# Patient Record
Sex: FEMALE | Race: WHITE | NOT HISPANIC OR LATINO
[De-identification: names, ages, dates, MRNs, and addresses within clinical notes are randomized per-mention and may not be internally consistent; named-entity substitution may affect disease eponyms.]

---

## 2017-06-26 ENCOUNTER — RESULT REVIEW (OUTPATIENT)
Age: 32
End: 2017-06-26

## 2019-02-26 ENCOUNTER — TRANSCRIPTION ENCOUNTER (OUTPATIENT)
Age: 34
End: 2019-02-26

## 2019-02-26 ENCOUNTER — OUTPATIENT (OUTPATIENT)
Dept: OUTPATIENT SERVICES | Facility: HOSPITAL | Age: 34
LOS: 1 days | End: 2019-02-26

## 2019-02-26 VITALS
HEIGHT: 64 IN | HEART RATE: 66 BPM | SYSTOLIC BLOOD PRESSURE: 100 MMHG | DIASTOLIC BLOOD PRESSURE: 60 MMHG | WEIGHT: 128.09 LBS | OXYGEN SATURATION: 98 % | TEMPERATURE: 98 F | RESPIRATION RATE: 16 BRPM

## 2019-02-26 DIAGNOSIS — O03.9 COMPLETE OR UNSPECIFIED SPONTANEOUS ABORTION WITHOUT COMPLICATION: ICD-10-CM

## 2019-02-26 DIAGNOSIS — O02.1 MISSED ABORTION: ICD-10-CM

## 2019-02-26 LAB
BLD GP AB SCN SERPL QL: NEGATIVE — SIGNIFICANT CHANGE UP
HCT VFR BLD CALC: 33.6 % — LOW (ref 34.5–45)
HGB BLD-MCNC: 11 G/DL — LOW (ref 11.5–15.5)
MCHC RBC-ENTMCNC: 30.2 PG — SIGNIFICANT CHANGE UP (ref 27–34)
MCHC RBC-ENTMCNC: 32.7 % — SIGNIFICANT CHANGE UP (ref 32–36)
MCV RBC AUTO: 92.3 FL — SIGNIFICANT CHANGE UP (ref 80–100)
NRBC # FLD: 0 K/UL — LOW (ref 25–125)
PLATELET # BLD AUTO: 224 K/UL — SIGNIFICANT CHANGE UP (ref 150–400)
PMV BLD: 10.6 FL — SIGNIFICANT CHANGE UP (ref 7–13)
RBC # BLD: 3.64 M/UL — LOW (ref 3.8–5.2)
RBC # FLD: 13.4 % — SIGNIFICANT CHANGE UP (ref 10.3–14.5)
RH IG SCN BLD-IMP: POSITIVE — SIGNIFICANT CHANGE UP
WBC # BLD: 7.94 K/UL — SIGNIFICANT CHANGE UP (ref 3.8–10.5)
WBC # FLD AUTO: 7.94 K/UL — SIGNIFICANT CHANGE UP (ref 3.8–10.5)

## 2019-02-26 NOTE — H&P PST ADULT - HISTORY OF PRESENT ILLNESS
This is a 34 y/o female whose native language is Indonesian; comprehends and verbalizes some English. Refused hospital  and requested that , Saima,  function as .  Patient presents at approximately 12 weeks gestation with recent sonogram revealing "no heart beat." Scheduled for DVC on 2-27-19

## 2019-02-26 NOTE — H&P PST ADULT - SOURCE OF INFORMATION, PROFILE
, Audie functioned as ; Audie's cell [phone]; home [phone]; Audie's work [phone]/patient

## 2019-02-26 NOTE — H&P PST ADULT - PMH
Language barrier  Native language Cook Islander; comprehends and verbalizes some English  Miscarriage  pregnancy at present in Feb. 2019

## 2019-02-26 NOTE — H&P PST ADULT - LYMPHATIC
supraclavicular R/posterior cervical R/supraclavicular L/posterior cervical L/anterior cervical R/anterior cervical L

## 2019-02-26 NOTE — ASU PATIENT PROFILE, ADULT - PMH
Language barrier  Native language Singaporean; comprehends and verbalizes some English  Miscarriage  pregnancy at present in Feb. 2019

## 2019-02-27 ENCOUNTER — RESULT REVIEW (OUTPATIENT)
Age: 34
End: 2019-02-27

## 2019-02-27 ENCOUNTER — OUTPATIENT (OUTPATIENT)
Dept: OUTPATIENT SERVICES | Facility: HOSPITAL | Age: 34
LOS: 1 days | Discharge: HOME | End: 2019-02-27
Payer: COMMERCIAL

## 2019-02-27 ENCOUNTER — OUTPATIENT (OUTPATIENT)
Dept: OUTPATIENT SERVICES | Facility: HOSPITAL | Age: 34
LOS: 1 days | End: 2019-02-27
Payer: SELF-PAY

## 2019-02-27 VITALS
HEART RATE: 77 BPM | RESPIRATION RATE: 16 BRPM | WEIGHT: 128.09 LBS | HEIGHT: 64 IN | OXYGEN SATURATION: 100 % | TEMPERATURE: 99 F | DIASTOLIC BLOOD PRESSURE: 50 MMHG | SYSTOLIC BLOOD PRESSURE: 107 MMHG

## 2019-02-27 VITALS
HEART RATE: 66 BPM | TEMPERATURE: 99 F | SYSTOLIC BLOOD PRESSURE: 112 MMHG | DIASTOLIC BLOOD PRESSURE: 58 MMHG | OXYGEN SATURATION: 100 % | RESPIRATION RATE: 18 BRPM

## 2019-02-27 DIAGNOSIS — O02.1 MISSED ABORTION: ICD-10-CM

## 2019-02-27 PROCEDURE — 88305 TISSUE EXAM BY PATHOLOGIST: CPT | Mod: 26

## 2019-02-27 PROCEDURE — 88280 CHROMOSOME KARYOTYPE STUDY: CPT

## 2019-02-27 PROCEDURE — 88291 CYTO/MOLECULAR REPORT: CPT

## 2019-02-27 PROCEDURE — 88264 CHROMOSOME ANALYSIS 20-25: CPT

## 2019-02-27 PROCEDURE — 88233 TISSUE CULTURE SKIN/BIOPSY: CPT

## 2019-02-27 RX ORDER — SODIUM CHLORIDE 9 MG/ML
1000 INJECTION, SOLUTION INTRAVENOUS
Qty: 0 | Refills: 0 | Status: DISCONTINUED | OUTPATIENT
Start: 2019-02-27 | End: 2019-03-01

## 2019-02-27 RX ORDER — SODIUM CHLORIDE 9 MG/ML
1000 INJECTION, SOLUTION INTRAVENOUS
Qty: 0 | Refills: 0 | Status: DISCONTINUED | OUTPATIENT
Start: 2019-02-27 | End: 2019-02-27

## 2019-02-27 RX ADMIN — SODIUM CHLORIDE 30 MILLILITER(S): 9 INJECTION, SOLUTION INTRAVENOUS at 14:24

## 2019-02-27 NOTE — ASU DISCHARGE PLAN (ADULT/PEDIATRIC). - MEDICATION SUMMARY - MEDICATIONS TO TAKE
I will START or STAY ON the medications listed below when I get home from the hospital:  None I will START or STAY ON the medications listed below when I get home from the hospital:    Methergine 0.2 mg oral tablet  -- 1 tab(s) by mouth every 4 hours   -- It is very important that you take or use this exactly as directed.  Do not skip doses or discontinue unless directed by your doctor.    -- Indication: For bleeding

## 2019-02-27 NOTE — ASU DISCHARGE PLAN (ADULT/PEDIATRIC). - NOTIFY
Bleeding that does not stop Persistent Nausea and Vomiting/Unable to Urinate/Bleeding that does not stop/Pain not relieved by Medications/GYN Fever>100.4/Inability to Tolerate Liquids or Foods

## 2019-02-27 NOTE — ASU DISCHARGE PLAN (ADULT/PEDIATRIC). - NURSING INSTRUCTIONS
take OTC tylenol or acetominophen for pain OR OTC motrin or advil  Nothing in vagina, no intercourse, no douching, no tampons, no tub baths, and no swimming for 2 weeks or until cleared by M.D. Nothing in vagina, no intercourse, no douching, no tampons, no tub baths, and no swimming for 2 weeks or until cleared by M.D. Nothing in vagina, no intercourse, no douching, no tampons, no tub baths, and no swimming for 2 weeks or until cleared by M.D. take OTC tylenol or acetominophen for pain OR OTC motrin or advil as needed for pain

## 2019-03-01 PROBLEM — Z78.9 OTHER SPECIFIED HEALTH STATUS: Chronic | Status: ACTIVE | Noted: 2019-02-26

## 2019-03-05 LAB — SURGICAL PATHOLOGY STUDY: SIGNIFICANT CHANGE UP

## 2019-03-20 LAB — CHROM ANALY OVERALL INTERP SPEC-IMP: SIGNIFICANT CHANGE UP

## 2019-06-03 ENCOUNTER — RESULT REVIEW (OUTPATIENT)
Age: 34
End: 2019-06-03

## 2019-09-23 ENCOUNTER — EMERGENCY (EMERGENCY)
Facility: HOSPITAL | Age: 34
LOS: 1 days | Discharge: HOME | End: 2019-09-23
Admitting: STUDENT IN AN ORGANIZED HEALTH CARE EDUCATION/TRAINING PROGRAM
Payer: COMMERCIAL

## 2019-09-23 VITALS
HEART RATE: 66 BPM | RESPIRATION RATE: 16 BRPM | OXYGEN SATURATION: 100 % | SYSTOLIC BLOOD PRESSURE: 96 MMHG | DIASTOLIC BLOOD PRESSURE: 50 MMHG | TEMPERATURE: 98 F

## 2019-09-23 LAB
ALBUMIN SERPL ELPH-MCNC: 4.6 G/DL — SIGNIFICANT CHANGE UP (ref 3.3–5)
ALP SERPL-CCNC: 55 U/L — SIGNIFICANT CHANGE UP (ref 40–120)
ALT FLD-CCNC: 13 U/L — SIGNIFICANT CHANGE UP (ref 4–33)
ANION GAP SERPL CALC-SCNC: 14 MMO/L — SIGNIFICANT CHANGE UP (ref 7–14)
APPEARANCE UR: CLEAR — SIGNIFICANT CHANGE UP
AST SERPL-CCNC: 15 U/L — SIGNIFICANT CHANGE UP (ref 4–32)
BACTERIA # UR AUTO: HIGH
BASOPHILS # BLD AUTO: 0.03 K/UL — SIGNIFICANT CHANGE UP (ref 0–0.2)
BASOPHILS NFR BLD AUTO: 0.3 % — SIGNIFICANT CHANGE UP (ref 0–2)
BILIRUB SERPL-MCNC: 0.3 MG/DL — SIGNIFICANT CHANGE UP (ref 0.2–1.2)
BILIRUB UR-MCNC: NEGATIVE — SIGNIFICANT CHANGE UP
BLOOD UR QL VISUAL: SIGNIFICANT CHANGE UP
BUN SERPL-MCNC: 9 MG/DL — SIGNIFICANT CHANGE UP (ref 7–23)
CALCIUM SERPL-MCNC: 9.9 MG/DL — SIGNIFICANT CHANGE UP (ref 8.4–10.5)
CHLORIDE SERPL-SCNC: 102 MMOL/L — SIGNIFICANT CHANGE UP (ref 98–107)
CO2 SERPL-SCNC: 22 MMOL/L — SIGNIFICANT CHANGE UP (ref 22–31)
COLOR SPEC: SIGNIFICANT CHANGE UP
CREAT SERPL-MCNC: 0.6 MG/DL — SIGNIFICANT CHANGE UP (ref 0.5–1.3)
EOSINOPHIL # BLD AUTO: 0.03 K/UL — SIGNIFICANT CHANGE UP (ref 0–0.5)
EOSINOPHIL NFR BLD AUTO: 0.3 % — SIGNIFICANT CHANGE UP (ref 0–6)
GLUCOSE SERPL-MCNC: 80 MG/DL — SIGNIFICANT CHANGE UP (ref 70–99)
GLUCOSE UR-MCNC: NEGATIVE — SIGNIFICANT CHANGE UP
HCG SERPL-ACNC: SIGNIFICANT CHANGE UP MIU/ML
HCT VFR BLD CALC: 37.3 % — SIGNIFICANT CHANGE UP (ref 34.5–45)
HGB BLD-MCNC: 12.5 G/DL — SIGNIFICANT CHANGE UP (ref 11.5–15.5)
HYALINE CASTS # UR AUTO: NEGATIVE — SIGNIFICANT CHANGE UP
IMM GRANULOCYTES NFR BLD AUTO: 0.3 % — SIGNIFICANT CHANGE UP (ref 0–1.5)
KETONES UR-MCNC: SIGNIFICANT CHANGE UP
LEUKOCYTE ESTERASE UR-ACNC: NEGATIVE — SIGNIFICANT CHANGE UP
LIDOCAIN IGE QN: 24.4 U/L — SIGNIFICANT CHANGE UP (ref 7–60)
LYMPHOCYTES # BLD AUTO: 19.7 % — SIGNIFICANT CHANGE UP (ref 13–44)
LYMPHOCYTES # BLD AUTO: 2.03 K/UL — SIGNIFICANT CHANGE UP (ref 1–3.3)
MCHC RBC-ENTMCNC: 30.3 PG — SIGNIFICANT CHANGE UP (ref 27–34)
MCHC RBC-ENTMCNC: 33.5 % — SIGNIFICANT CHANGE UP (ref 32–36)
MCV RBC AUTO: 90.5 FL — SIGNIFICANT CHANGE UP (ref 80–100)
MONOCYTES # BLD AUTO: 0.63 K/UL — SIGNIFICANT CHANGE UP (ref 0–0.9)
MONOCYTES NFR BLD AUTO: 6.1 % — SIGNIFICANT CHANGE UP (ref 2–14)
NEUTROPHILS # BLD AUTO: 7.58 K/UL — HIGH (ref 1.8–7.4)
NEUTROPHILS NFR BLD AUTO: 73.3 % — SIGNIFICANT CHANGE UP (ref 43–77)
NITRITE UR-MCNC: POSITIVE — HIGH
NRBC # FLD: 0 K/UL — SIGNIFICANT CHANGE UP (ref 0–0)
PH UR: 7 — SIGNIFICANT CHANGE UP (ref 5–8)
PLATELET # BLD AUTO: 231 K/UL — SIGNIFICANT CHANGE UP (ref 150–400)
PMV BLD: 10.2 FL — SIGNIFICANT CHANGE UP (ref 7–13)
POTASSIUM SERPL-MCNC: 4.2 MMOL/L — SIGNIFICANT CHANGE UP (ref 3.5–5.3)
POTASSIUM SERPL-SCNC: 4.2 MMOL/L — SIGNIFICANT CHANGE UP (ref 3.5–5.3)
PROT SERPL-MCNC: 7.7 G/DL — SIGNIFICANT CHANGE UP (ref 6–8.3)
PROT UR-MCNC: NEGATIVE — SIGNIFICANT CHANGE UP
RBC # BLD: 4.12 M/UL — SIGNIFICANT CHANGE UP (ref 3.8–5.2)
RBC # FLD: 12.6 % — SIGNIFICANT CHANGE UP (ref 10.3–14.5)
RBC CASTS # UR COMP ASSIST: SIGNIFICANT CHANGE UP (ref 0–?)
SODIUM SERPL-SCNC: 138 MMOL/L — SIGNIFICANT CHANGE UP (ref 135–145)
SP GR SPEC: 1.01 — SIGNIFICANT CHANGE UP (ref 1–1.04)
SQUAMOUS # UR AUTO: SIGNIFICANT CHANGE UP
UROBILINOGEN FLD QL: NORMAL — SIGNIFICANT CHANGE UP
WBC # BLD: 10.33 K/UL — SIGNIFICANT CHANGE UP (ref 3.8–10.5)
WBC # FLD AUTO: 10.33 K/UL — SIGNIFICANT CHANGE UP (ref 3.8–10.5)
WBC UR QL: SIGNIFICANT CHANGE UP (ref 0–?)

## 2019-09-23 PROCEDURE — 99284 EMERGENCY DEPT VISIT MOD MDM: CPT | Mod: 25

## 2019-09-23 PROCEDURE — 76830 TRANSVAGINAL US NON-OB: CPT | Mod: 26

## 2019-09-23 PROCEDURE — 93010 ELECTROCARDIOGRAM REPORT: CPT

## 2019-09-23 PROCEDURE — 76705 ECHO EXAM OF ABDOMEN: CPT | Mod: 26

## 2019-09-23 RX ORDER — CEPHALEXIN 500 MG
1 CAPSULE ORAL
Qty: 28 | Refills: 0
Start: 2019-09-23 | End: 2019-09-29

## 2019-09-23 RX ORDER — CEPHALEXIN 500 MG
1 CAPSULE ORAL
Qty: 14 | Refills: 0
Start: 2019-09-23 | End: 2019-09-29

## 2019-09-23 RX ORDER — CEPHALEXIN 500 MG
500 CAPSULE ORAL ONCE
Refills: 0 | Status: COMPLETED | OUTPATIENT
Start: 2019-09-23 | End: 2019-09-23

## 2019-09-23 RX ADMIN — Medication 500 MILLIGRAM(S): at 14:06

## 2019-09-23 NOTE — ED PROVIDER NOTE - PMH
Language barrier  Native language Cape Verdean; comprehends and verbalizes some English  Miscarriage  pregnancy at present in Feb. 2019

## 2019-09-23 NOTE — ED PROVIDER NOTE - CLINICAL SUMMARY MEDICAL DECISION MAKING FREE TEXT BOX
34 year old female 8 weeks gestation by LMP 8/3 pw epigastric pain that radiates to the back for 2 hours this morning.   Plan: labs, TVUS - determine viability of pregnancy, RUQ US to r/o cholecystitis

## 2019-09-23 NOTE — ED PROVIDER NOTE - NSFOLLOWUPINSTRUCTIONS_ED_ALL_ED_FT
Take Keflex 500mg 4 times a day for 7 days for UTI. Please continue all home medications as directed. See your regular doctor/OBGYN within 72 hours for follow-up care - bring a copy of your results with you to your appointment. Return to ER for new or worsening symptoms.

## 2019-09-23 NOTE — ED PROVIDER NOTE - PATIENT PORTAL LINK FT
You can access the FollowMyHealth Patient Portal offered by [facility] by registering at the following website: [email or web address]. By joining [facility]’s FollowMyHealth portal, you will also be able to view your health information using other applications (apps) compatible with our system.

## 2019-09-23 NOTE — ED ADULT TRIAGE NOTE - CHIEF COMPLAINT QUOTE
Pt c/o epigastric pain radiating to back with SOB since this morning. Denies N/V Pt is 8 weeks pregnant, c/o epigastric pain radiating to back with SOB since this morning. Denies N/V

## 2019-09-23 NOTE — ED PROVIDER NOTE - OBJECTIVE STATEMENT
34 year old female 8 weeks gestation by LMP 8/3 pw epigastric pain that radiates to the back for 2 hours this morning. She is now asymptomatic. Pt has appointment with her OBGYN today at 2pm. Denies n/v/f/c, Cp, SOB, dysuria, hematuria, melena, hematochezia, diarrhea, constipation, vaginal bleeding/dc/itching/odor.

## 2019-11-04 ENCOUNTER — EMERGENCY (EMERGENCY)
Facility: HOSPITAL | Age: 34
LOS: 1 days | Discharge: HOME | End: 2019-11-04
Attending: EMERGENCY MEDICINE | Admitting: EMERGENCY MEDICINE
Payer: COMMERCIAL

## 2019-11-04 VITALS
HEART RATE: 68 BPM | TEMPERATURE: 98 F | DIASTOLIC BLOOD PRESSURE: 62 MMHG | RESPIRATION RATE: 16 BRPM | SYSTOLIC BLOOD PRESSURE: 102 MMHG | OXYGEN SATURATION: 100 %

## 2019-11-04 PROCEDURE — 99283 EMERGENCY DEPT VISIT LOW MDM: CPT | Mod: 25

## 2019-11-04 PROCEDURE — 76815 OB US LIMITED FETUS(S): CPT | Mod: 26

## 2019-11-04 NOTE — ED PROVIDER NOTE - CLINICAL SUMMARY MEDICAL DECISION MAKING FREE TEXT BOX
35 y/o F, , who is 12 weeks and 5 days to date, w/ LMP on 19, here w/ L sided upper abdominal pain that started 2 hours ago. Bedside sonogram shows IUP w/ fetal heartrate of 157 BMP. Will check urine for possible UTI and dc to follow w/ OB since pt has appointment tomorrow.

## 2019-11-04 NOTE — ED PROCEDURE NOTE - PROCEDURE ADDITIONAL DETAILS
[address]138, Ultrasound, transabdominal, pregnancy, limited    Focused ED ultrasound transabdominal OB to evaluate for Intrauterine Pregnancy:    Indication: *    Findings: Single intrauterine pregnancy noted  fetal heart rate measured: 157 bpm    impression: single live intrauterine pregnancy    Procedure note and images placed in chart

## 2019-11-04 NOTE — ED ADULT TRIAGE NOTE - CHIEF COMPLAINT QUOTE
Pt comes in for c/o left side abd pain for last 2 hrs, denies any bleeding /discharge. Pt is 12 wks 4days pregnant with 3rd child, AVILA 5/15/20 and LMP 8/8/19. Pt appears in no acute distress, vs as noted

## 2019-11-04 NOTE — ED PROVIDER NOTE - NSFOLLOWUPINSTRUCTIONS_ED_ALL_ED_FT
Advance activity as tolerated.  Continue all previously prescribed medications as directed unless otherwise instructed.  Follow up with your OB in 48-72 hours- bring copies of your results.  Return to the ER for worsening or persistent symptoms, and/or ANY NEW OR CONCERNING SYMPTOMS. If you have issues obtaining follow up, please call: [phone] to obtain a doctor or specialist who takes your insurance in your area.  You may call [phone] to make an appointment with the internal medicine clinic.

## 2019-11-04 NOTE — ED PROVIDER NOTE - PATIENT PORTAL LINK FT
You can access the FollowMyHealth Patient Portal offered by [facility] by registering at the following website: [email or web address]. By joining [facility]’s FollowMyHealth portal, you will also be able to view your health information using other applications (apps) compatible with our system.

## 2019-11-04 NOTE — ED PROVIDER NOTE - OBJECTIVE STATEMENT
33 y/o F, , who is 12 weeks and 5 days to date, w/ LNMP on 19, here w/ L sided upper abdominal pain that started 2 hours ago. Pt is concerned, although it is not sever pain, because she had a miscarriage 7 months ago. Denies any fever, chills, nausea, vomiting, dysuria or vaginal bleeding. 33 y/o F, , who is 12 weeks and 5 days to date, w/ LNMP on 19, here w/ L sided upper abdominal pain that started 2 hours ago. Pt is concerned, although it is not severe pain, because she had a miscarriage 7 months ago. Denies any fever, chills, nausea, vomiting, dysuria or vaginal bleeding.

## 2019-11-04 NOTE — ED PROVIDER NOTE - PMH
Language barrier  Native language Slovenian; comprehends and verbalizes some English  Miscarriage  pregnancy at present in Feb. 2019

## 2020-02-15 ENCOUNTER — OUTPATIENT (OUTPATIENT)
Dept: INPATIENT UNIT | Facility: HOSPITAL | Age: 35
LOS: 1 days | Discharge: HOME | End: 2020-02-15
Payer: COMMERCIAL

## 2020-02-15 VITALS
SYSTOLIC BLOOD PRESSURE: 109 MMHG | DIASTOLIC BLOOD PRESSURE: 58 MMHG | HEART RATE: 75 BPM | RESPIRATION RATE: 18 BRPM | TEMPERATURE: 98 F

## 2020-02-15 VITALS — DIASTOLIC BLOOD PRESSURE: 54 MMHG | HEART RATE: 71 BPM | SYSTOLIC BLOOD PRESSURE: 105 MMHG

## 2020-02-15 DIAGNOSIS — Z98.890 OTHER SPECIFIED POSTPROCEDURAL STATES: Chronic | ICD-10-CM

## 2020-02-15 DIAGNOSIS — Z3A.00 WEEKS OF GESTATION OF PREGNANCY NOT SPECIFIED: ICD-10-CM

## 2020-02-15 DIAGNOSIS — O26.899 OTHER SPECIFIED PREGNANCY RELATED CONDITIONS, UNSPECIFIED TRIMESTER: ICD-10-CM

## 2020-02-15 LAB
APPEARANCE UR: SIGNIFICANT CHANGE UP
BACTERIA # UR AUTO: HIGH
BILIRUB UR-MCNC: NEGATIVE — SIGNIFICANT CHANGE UP
BLOOD UR QL VISUAL: NEGATIVE — SIGNIFICANT CHANGE UP
COLOR SPEC: SIGNIFICANT CHANGE UP
GLUCOSE UR-MCNC: NEGATIVE — SIGNIFICANT CHANGE UP
HYALINE CASTS # UR AUTO: NEGATIVE — SIGNIFICANT CHANGE UP
KETONES UR-MCNC: NEGATIVE — SIGNIFICANT CHANGE UP
LEUKOCYTE ESTERASE UR-ACNC: SIGNIFICANT CHANGE UP
NITRITE UR-MCNC: NEGATIVE — SIGNIFICANT CHANGE UP
PH UR: 7.5 — SIGNIFICANT CHANGE UP (ref 5–8)
PROT UR-MCNC: NEGATIVE — SIGNIFICANT CHANGE UP
RBC CASTS # UR COMP ASSIST: SIGNIFICANT CHANGE UP (ref 0–?)
SP GR SPEC: 1.01 — SIGNIFICANT CHANGE UP (ref 1–1.04)
SQUAMOUS # UR AUTO: SIGNIFICANT CHANGE UP
UROBILINOGEN FLD QL: NORMAL — SIGNIFICANT CHANGE UP
WBC UR QL: HIGH (ref 0–?)

## 2020-02-15 PROCEDURE — 99214 OFFICE O/P EST MOD 30 MIN: CPT

## 2020-02-15 PROCEDURE — 59025 FETAL NON-STRESS TEST: CPT | Mod: 26

## 2020-02-15 PROCEDURE — 76817 TRANSVAGINAL US OBSTETRIC: CPT | Mod: 26

## 2020-02-15 RX ORDER — NITROFURANTOIN MACROCRYSTAL 50 MG
1 CAPSULE ORAL
Qty: 6 | Refills: 0
Start: 2020-02-15 | End: 2020-02-17

## 2020-02-15 NOTE — OB PROVIDER TRIAGE NOTE - NSOBPROVIDERNOTE_OBGYN_ALL_OB_FT
Patient is a 35y/o ,  @ 27 2/7wks gestation who reports to triage with c/o intermittent non radiating URQ since this morning and lower abdominal pressure. The lower abdominal pressure decreases with supine or semifowlers position.  The RUQ pain is random;  and 6-7/10 when it started.  She also reports increased fetal movements this morning.   Reports urinary urgency and frequency.    Denies dysuria, lof, vaginal bleeding, fever, chills, nausea or vomiting.    Denies AP Complications  Meds - pnv  NKDA    PMH/PSH/GYN/SH - denies  OB  - - 2012 - 8lbs 9oz  - - 2016 - 8lbs 7oz  -Missed ab with D&C - 2019    Heart rrr  Lungs ctab  Abdomen gravid, soft and nontender  Negative Shoemaker's sign  Negative cva or suprapubic tenderness  TAS - footling breech/  /mvp 7.15  ruq pain more than likely due to fetal position.  SSE - Cervix appears closed.  Neg pooling/neg nitrazine  TVUS - 3.74 to 4.1cm with no dynamic changes  Orders:  UA  Next pn appointment is on 3/2/2019    Discussed patient with Dr Santiago.  Plan:  discharge home with Macrobid x 3 dys  and send Urine culture.

## 2020-02-15 NOTE — OB PROVIDER TRIAGE NOTE - PMH
Language barrier  Native language Moroccan; comprehends and verbalizes some English  Miscarriage  pregnancy at present in Feb. 2019

## 2020-02-15 NOTE — OB PROVIDER TRIAGE NOTE - HISTORY OF PRESENT ILLNESS
Patient is a 33y/o ,  @ 27 2/7wks gestation who reports to triage with c/o intermittent non radiating URQ since this morning and lower abdominal pressure. The lower abdominal pressure decreases with supine or semifowlers position.  The RUQ pain is random;  and 6-7/10 when it started.  She also reports increased fetal movements this morning.   Reports urinary urgency and frequency.    Denies dysuria, lof, vaginal bleeding, fever, chills, nausea or vomiting.    Denies AP Complications  Meds - pnv  NKDA

## 2020-02-15 NOTE — OB RN TRIAGE NOTE - PMH
Language barrier  Native language Malian; comprehends and verbalizes some English  Miscarriage  pregnancy at present in Feb. 2019

## 2020-02-15 NOTE — OB PROVIDER TRIAGE NOTE - NSHPLABSRESULTS_GEN_ALL_CORE
ua    Urinalysis Basic - ( 15 Feb 2020 17:24 )    Color: LIGHT YELLOW / Appearance: Lt TURBID / S.008 / pH: 7.5  Gluc: NEGATIVE / Ketone: NEGATIVE  / Bili: NEGATIVE / Urobili: NORMAL   Blood: NEGATIVE / Protein: NEGATIVE / Nitrite: NEGATIVE   Leuk Esterase: SMALL / RBC: 0-2 / WBC 6-10   Sq Epi: MODERATE / Non Sq Epi: x / Bacteria: MODERATE

## 2020-02-15 NOTE — OB PROVIDER TRIAGE NOTE - NSHPPHYSICALEXAM_GEN_ALL_CORE
Vital Signs Last 24 Hrs  T(C): 36.7 (15 Feb 2020 17:15), Max: 36.7 (15 Feb 2020 16:57)  HR: 71 (15 Feb 2020 18:15) (71 - 75)  BP: 105/54 (15 Feb 2020 18:15) (105/54 - 110/53)  RR: 18 (15 Feb 2020 16:57) (18 - 18)  Heart rrr  Lungs ctab  Abdomen gravid, soft and nontender  Negative Shoemaker's sign  Negative cva or suprapubic tenderness  TAS - footling breech/  /mvp 7.15  ruq pain more than likely due to fetal position.  SSE - Cervix appears closed.  Neg pooling/neg nitrazine  TVUS - 3.74 to 4.1cm with no dynamic changes

## 2020-02-15 NOTE — OB PROVIDER TRIAGE NOTE - NS_PHYSICALALLNEG_OBGYN_ALL_OB
povidone-iodine ( under 2 weeks of age or 1500 grams) All other review of systems negative, except as noted in HPI

## 2020-02-17 LAB
BACTERIA UR CULT: SIGNIFICANT CHANGE UP
SPECIMEN SOURCE: SIGNIFICANT CHANGE UP

## 2020-04-08 NOTE — ASU PATIENT PROFILE, ADULT - NPO AFTER
From: Janice Cali  To: Jef Polanco DO  Sent: 4/7/2020 7:52 PM CDT  Subject: Medication Question    I have a tooth that has broken off down to gum level and have a pain level that is now  giving me higher blood pressure, dizzyness and I can no longer stay asleep from the  pain. When does this become an emergency. 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 I am  nauseaous, dizzy and not in good shape between the sinus pain daily and the tooth   pain I'm exhausted. I have issues with my mouth doesn't freeze right I end up feeling   everything there doing. Plus I have TMJ which my mouth starts to shake after 60 seconds  of being opened for any reason.  Do I need a letter of recommendation to an oral surgeon? because of covid19?    Painfully,    Janice Cali   23:00

## 2020-05-15 ENCOUNTER — TRANSCRIPTION ENCOUNTER (OUTPATIENT)
Age: 35
End: 2020-05-15

## 2020-05-15 ENCOUNTER — OUTPATIENT (OUTPATIENT)
Dept: INPATIENT UNIT | Facility: HOSPITAL | Age: 35
LOS: 1 days | Discharge: HOME | End: 2020-05-15
Payer: COMMERCIAL

## 2020-05-15 VITALS
DIASTOLIC BLOOD PRESSURE: 72 MMHG | TEMPERATURE: 98 F | RESPIRATION RATE: 17 BRPM | HEART RATE: 104 BPM | SYSTOLIC BLOOD PRESSURE: 116 MMHG

## 2020-05-15 VITALS — DIASTOLIC BLOOD PRESSURE: 53 MMHG | HEART RATE: 86 BPM | SYSTOLIC BLOOD PRESSURE: 101 MMHG

## 2020-05-15 DIAGNOSIS — Z98.890 OTHER SPECIFIED POSTPROCEDURAL STATES: Chronic | ICD-10-CM

## 2020-05-15 DIAGNOSIS — O26.899 OTHER SPECIFIED PREGNANCY RELATED CONDITIONS, UNSPECIFIED TRIMESTER: ICD-10-CM

## 2020-05-15 DIAGNOSIS — Z3A.00 WEEKS OF GESTATION OF PREGNANCY NOT SPECIFIED: ICD-10-CM

## 2020-05-15 PROCEDURE — 99213 OFFICE O/P EST LOW 20 MIN: CPT | Mod: 25

## 2020-05-15 PROCEDURE — 76816 OB US FOLLOW-UP PER FETUS: CPT | Mod: 26

## 2020-05-15 PROCEDURE — 59025 FETAL NON-STRESS TEST: CPT | Mod: 26

## 2020-05-15 NOTE — OB PROVIDER TRIAGE NOTE - ADDITIONAL INSTRUCTIONS
labor precautions reviewed  daily kick counts reviewed  f/u with next scheduled appointment on Monday

## 2020-05-15 NOTE — OB PROVIDER TRIAGE NOTE - NSHPPHYSICALEXAM_GEN_ALL_CORE
speculum exam - negative pool/dye/fern  sve 1/60/-3    abdomen soft and nontender  TAS - cephalic presentation, anterior placenta, jaylin 17.6cm, bpp 8/8    ICU Vital Signs Last 24 Hrs  T(C): 36.8 (15 May 2020 13:52), Max: 36.8 (15 May 2020 13:52)  T(F): 98.24 (15 May 2020 13:52), Max: 98.24 (15 May 2020 13:52)  HR: 86 (15 May 2020 14:28) (81 - 113)  BP: 101/53 (15 May 2020 14:28) (101/53 - 116/72)  BP(mean): --  ABP: --  ABP(mean): --  RR: 17 (15 May 2020 13:52) (17 - 17)  SpO2: 96% (15 May 2020 14:26) (96% - 98%)    Cat 1 fhr tracing, fhr 130 with mod variability, accels, no decels  irregular contractions on toco

## 2020-05-15 NOTE — OB RN TRIAGE NOTE - PMH
Language barrier  Native language Maltese; comprehends and verbalizes some English  Miscarriage  2019  Normal vaginal delivery    8#9    8#2

## 2020-05-15 NOTE — OB PROVIDER TRIAGE NOTE - PMH
Language barrier  Native language Estonian; comprehends and verbalizes some English  Miscarriage  2019  Normal vaginal delivery    8#9    8#2

## 2020-05-15 NOTE — OB PROVIDER TRIAGE NOTE - NSOBPROVIDERNOTE_OBGYN_ALL_OB_FT
d/w Dr. Gastelum  pt discharged home - no evidence of prom  daily kick counts reviewed  f/u with next scheduled appointment on Monday  labor precautions reviewed

## 2020-05-16 ENCOUNTER — INPATIENT (INPATIENT)
Facility: HOSPITAL | Age: 35
LOS: 1 days | Discharge: HOME | End: 2020-05-18
Attending: OBSTETRICS & GYNECOLOGY | Admitting: OBSTETRICS & GYNECOLOGY

## 2020-05-16 ENCOUNTER — TRANSCRIPTION ENCOUNTER (OUTPATIENT)
Age: 35
End: 2020-05-16

## 2020-05-16 VITALS
DIASTOLIC BLOOD PRESSURE: 53 MMHG | RESPIRATION RATE: 18 BRPM | TEMPERATURE: 98 F | HEART RATE: 76 BPM | SYSTOLIC BLOOD PRESSURE: 106 MMHG

## 2020-05-16 DIAGNOSIS — Z3A.00 WEEKS OF GESTATION OF PREGNANCY NOT SPECIFIED: ICD-10-CM

## 2020-05-16 DIAGNOSIS — Z98.890 OTHER SPECIFIED POSTPROCEDURAL STATES: Chronic | ICD-10-CM

## 2020-05-16 DIAGNOSIS — O26.899 OTHER SPECIFIED PREGNANCY RELATED CONDITIONS, UNSPECIFIED TRIMESTER: ICD-10-CM

## 2020-05-16 DIAGNOSIS — O42.90 PREMATURE RUPTURE OF MEMBRANES, UNSPECIFIED AS TO LENGTH OF TIME BETWEEN RUPTURE AND ONSET OF LABOR, UNSPECIFIED WEEKS OF GESTATION: ICD-10-CM

## 2020-05-16 PROBLEM — O03.9 COMPLETE OR UNSPECIFIED SPONTANEOUS ABORTION WITHOUT COMPLICATION: Chronic | Status: ACTIVE | Noted: 2019-02-26

## 2020-05-16 LAB
BASOPHILS # BLD AUTO: 0.05 K/UL — SIGNIFICANT CHANGE UP (ref 0–0.2)
BASOPHILS NFR BLD AUTO: 0.4 % — SIGNIFICANT CHANGE UP (ref 0–2)
BLD GP AB SCN SERPL QL: NEGATIVE — SIGNIFICANT CHANGE UP
EOSINOPHIL # BLD AUTO: 0.06 K/UL — SIGNIFICANT CHANGE UP (ref 0–0.5)
EOSINOPHIL NFR BLD AUTO: 0.5 % — SIGNIFICANT CHANGE UP (ref 0–6)
HCT VFR BLD CALC: 33.5 % — LOW (ref 34.5–45)
HGB BLD-MCNC: 11.2 G/DL — LOW (ref 11.5–15.5)
IMM GRANULOCYTES NFR BLD AUTO: 4.5 % — HIGH (ref 0–1.5)
LYMPHOCYTES # BLD AUTO: 1.89 K/UL — SIGNIFICANT CHANGE UP (ref 1–3.3)
LYMPHOCYTES # BLD AUTO: 15.6 % — SIGNIFICANT CHANGE UP (ref 13–44)
MCHC RBC-ENTMCNC: 30.9 PG — SIGNIFICANT CHANGE UP (ref 27–34)
MCHC RBC-ENTMCNC: 33.4 % — SIGNIFICANT CHANGE UP (ref 32–36)
MCV RBC AUTO: 92.3 FL — SIGNIFICANT CHANGE UP (ref 80–100)
MONOCYTES # BLD AUTO: 0.87 K/UL — SIGNIFICANT CHANGE UP (ref 0–0.9)
MONOCYTES NFR BLD AUTO: 7.2 % — SIGNIFICANT CHANGE UP (ref 2–14)
NEUTROPHILS # BLD AUTO: 8.68 K/UL — HIGH (ref 1.8–7.4)
NEUTROPHILS NFR BLD AUTO: 71.8 % — SIGNIFICANT CHANGE UP (ref 43–77)
NRBC # FLD: 0 K/UL — SIGNIFICANT CHANGE UP (ref 0–0)
PLATELET # BLD AUTO: 234 K/UL — SIGNIFICANT CHANGE UP (ref 150–400)
PMV BLD: 9.7 FL — SIGNIFICANT CHANGE UP (ref 7–13)
RBC # BLD: 3.63 M/UL — LOW (ref 3.8–5.2)
RBC # FLD: 13.3 % — SIGNIFICANT CHANGE UP (ref 10.3–14.5)
RH IG SCN BLD-IMP: POSITIVE — SIGNIFICANT CHANGE UP
SARS-COV-2 RNA SPEC QL NAA+PROBE: SIGNIFICANT CHANGE UP
WBC # BLD: 12.09 K/UL — HIGH (ref 3.8–10.5)
WBC # FLD AUTO: 12.09 K/UL — HIGH (ref 3.8–10.5)

## 2020-05-16 RX ORDER — INFLUENZA VIRUS VACCINE 15; 15; 15; 15 UG/.5ML; UG/.5ML; UG/.5ML; UG/.5ML
0.5 SUSPENSION INTRAMUSCULAR ONCE
Refills: 0 | Status: COMPLETED | OUTPATIENT
Start: 2020-05-16 | End: 2020-05-16

## 2020-05-16 RX ORDER — SODIUM CHLORIDE 9 MG/ML
1000 INJECTION, SOLUTION INTRAVENOUS ONCE
Refills: 0 | Status: DISCONTINUED | OUTPATIENT
Start: 2020-05-16 | End: 2020-05-16

## 2020-05-16 RX ORDER — KETOROLAC TROMETHAMINE 30 MG/ML
30 SYRINGE (ML) INJECTION EVERY 6 HOURS
Refills: 0 | Status: DISCONTINUED | OUTPATIENT
Start: 2020-05-16 | End: 2020-05-17

## 2020-05-16 RX ORDER — OXYTOCIN 10 UNIT/ML
333.33 VIAL (ML) INJECTION
Qty: 20 | Refills: 0 | Status: DISCONTINUED | OUTPATIENT
Start: 2020-05-16 | End: 2020-05-17

## 2020-05-16 RX ORDER — FAMOTIDINE 10 MG/ML
20 INJECTION INTRAVENOUS ONCE
Refills: 0 | Status: DISCONTINUED | OUTPATIENT
Start: 2020-05-16 | End: 2020-05-16

## 2020-05-16 RX ORDER — SODIUM CHLORIDE 9 MG/ML
1000 INJECTION, SOLUTION INTRAVENOUS
Refills: 0 | Status: DISCONTINUED | OUTPATIENT
Start: 2020-05-16 | End: 2020-05-16

## 2020-05-16 RX ORDER — SIMETHICONE 80 MG/1
80 TABLET, CHEWABLE ORAL EVERY 4 HOURS
Refills: 0 | Status: DISCONTINUED | OUTPATIENT
Start: 2020-05-16 | End: 2020-05-18

## 2020-05-16 RX ORDER — MAGNESIUM HYDROXIDE 400 MG/1
30 TABLET, CHEWABLE ORAL
Refills: 0 | Status: DISCONTINUED | OUTPATIENT
Start: 2020-05-16 | End: 2020-05-18

## 2020-05-16 RX ORDER — OXYCODONE HYDROCHLORIDE 5 MG/1
5 TABLET ORAL ONCE
Refills: 0 | Status: DISCONTINUED | OUTPATIENT
Start: 2020-05-16 | End: 2020-05-18

## 2020-05-16 RX ORDER — SODIUM CHLORIDE 9 MG/ML
1000 INJECTION, SOLUTION INTRAVENOUS
Refills: 0 | Status: DISCONTINUED | OUTPATIENT
Start: 2020-05-16 | End: 2020-05-17

## 2020-05-16 RX ORDER — IBUPROFEN 200 MG
600 TABLET ORAL EVERY 6 HOURS
Refills: 0 | Status: COMPLETED | OUTPATIENT
Start: 2020-05-16 | End: 2021-04-14

## 2020-05-16 RX ORDER — HEPARIN SODIUM 5000 [USP'U]/ML
5000 INJECTION INTRAVENOUS; SUBCUTANEOUS EVERY 12 HOURS
Refills: 0 | Status: DISCONTINUED | OUTPATIENT
Start: 2020-05-16 | End: 2020-05-18

## 2020-05-16 RX ORDER — LANOLIN
1 OINTMENT (GRAM) TOPICAL EVERY 6 HOURS
Refills: 0 | Status: DISCONTINUED | OUTPATIENT
Start: 2020-05-16 | End: 2020-05-18

## 2020-05-16 RX ORDER — METOCLOPRAMIDE HCL 10 MG
10 TABLET ORAL ONCE
Refills: 0 | Status: DISCONTINUED | OUTPATIENT
Start: 2020-05-16 | End: 2020-05-16

## 2020-05-16 RX ORDER — FAMOTIDINE 10 MG/ML
20 INJECTION INTRAVENOUS ONCE
Refills: 0 | Status: COMPLETED | OUTPATIENT
Start: 2020-05-16 | End: 2020-05-16

## 2020-05-16 RX ORDER — CITRIC ACID/SODIUM CITRATE 300-500 MG
30 SOLUTION, ORAL ORAL ONCE
Refills: 0 | Status: DISCONTINUED | OUTPATIENT
Start: 2020-05-16 | End: 2020-05-16

## 2020-05-16 RX ORDER — TETANUS TOXOID, REDUCED DIPHTHERIA TOXOID AND ACELLULAR PERTUSSIS VACCINE, ADSORBED 5; 2.5; 8; 8; 2.5 [IU]/.5ML; [IU]/.5ML; UG/.5ML; UG/.5ML; UG/.5ML
0.5 SUSPENSION INTRAMUSCULAR ONCE
Refills: 0 | Status: DISCONTINUED | OUTPATIENT
Start: 2020-05-16 | End: 2020-05-18

## 2020-05-16 RX ORDER — OXYCODONE HYDROCHLORIDE 5 MG/1
5 TABLET ORAL
Refills: 0 | Status: COMPLETED | OUTPATIENT
Start: 2020-05-16 | End: 2020-05-23

## 2020-05-16 RX ORDER — DIPHENHYDRAMINE HCL 50 MG
25 CAPSULE ORAL EVERY 6 HOURS
Refills: 0 | Status: DISCONTINUED | OUTPATIENT
Start: 2020-05-16 | End: 2020-05-18

## 2020-05-16 RX ORDER — ACETAMINOPHEN 500 MG
975 TABLET ORAL
Refills: 0 | Status: DISCONTINUED | OUTPATIENT
Start: 2020-05-16 | End: 2020-05-18

## 2020-05-16 RX ORDER — METOCLOPRAMIDE HCL 10 MG
10 TABLET ORAL ONCE
Refills: 0 | Status: COMPLETED | OUTPATIENT
Start: 2020-05-16 | End: 2020-05-16

## 2020-05-16 RX ORDER — CITRIC ACID/SODIUM CITRATE 300-500 MG
30 SOLUTION, ORAL ORAL ONCE
Refills: 0 | Status: COMPLETED | OUTPATIENT
Start: 2020-05-16 | End: 2020-05-16

## 2020-05-16 RX ADMIN — FAMOTIDINE 20 MILLIGRAM(S): 10 INJECTION INTRAVENOUS at 18:55

## 2020-05-16 RX ADMIN — Medication 30 MILLILITER(S): at 18:55

## 2020-05-16 RX ADMIN — Medication 10 MILLIGRAM(S): at 18:55

## 2020-05-16 RX ADMIN — HEPARIN SODIUM 5000 UNIT(S): 5000 INJECTION INTRAVENOUS; SUBCUTANEOUS at 23:00

## 2020-05-16 NOTE — CHART NOTE - NSCHARTNOTEFT_GEN_A_CORE
Cat 1 tracing  CTX q 3 min   AROM  thin Mec   FD/-2  OP   will use peanut ball allow head to descend   pt does not feel the urge to push yet

## 2020-05-16 NOTE — OB RN TRIAGE NOTE - PMH
Language barrier  Native language Bengali; comprehends and verbalizes some English  Miscarriage  2019  Normal vaginal delivery    8#9    8#2

## 2020-05-16 NOTE — OB PROVIDER TRIAGE NOTE - HISTORY OF PRESENT ILLNESS
33 yo white female  @ 40.2 wks come sin reporting painful contractions and leaking fluid see pink since 023. reports +GFM. pt reports seen yeteday in D&T for leaking fluid also.  AP course uncomplicated thus far. denies fever chills dysuria ha n/v/d, epigastric pain, new swelling, vision changes, cough, cp or sob. last seen by OB tuesday, last efw 7#7    GBS: negative  meds: PNV  All: denies  PMH: denies  PSH: d&c  gyn hx: denies  ob hx:    2012 40 wks 8#9 uncomplicated   2016 39 wks 8#2 uncomplicated

## 2020-05-16 NOTE — OB PROVIDER H&P - PMH
Language barrier  Native language Khmer; comprehends and verbalizes some English  Miscarriage  2019  Normal vaginal delivery    8#9    8#2

## 2020-05-16 NOTE — DISCHARGE NOTE OB - MEDICATION SUMMARY - MEDICATIONS TO STOP TAKING
I will STOP taking the medications listed below when I get home from the hospital:  None I will STOP taking the medications listed below when I get home from the hospital:    Macrobid 100 mg oral capsule  -- 1 cap(s) by mouth 2 times a day   -- Finish all this medication unless otherwise directed by prescriber.  May discolor urine or feces.  Take with food or milk.

## 2020-05-16 NOTE — OB PROVIDER H&P - ASSESSMENT
35 yo white female  @ 40.2 wks come sin reporting painful contractions and leaking fluid see pink since 0230. reports +GFM. pt reports seen yeteday in D&T for leaking fluid also.  AP course uncomplicated thus far. denies fever chills dysuria ha n/v/d, epigastric pain, new swelling, vision changes, cough, cp or sob. last seen by OB tuesday, last efw 7#7    GBS: negative 4/15/20  Abd: soft, gravid, NT  LS clear bilaterally  TAS: for position vertex  SVE: /-3  SSE: no pooling, scant pink on spec, cx appears closed, nitrazine equivacle, fern positive  FHT: baseline 115, + accels, negative decels moderate variability  toco: every 8 min  Vital Signs Last 24 Hrs  T(C): 36.7 (16 May 2020 04:53), Max: 36.8 (15 May 2020 13:52)  T(F): 98.1 (16 May 2020 04:53), Max: 98.24 (15 May 2020 13:52)  HR: 76 (16 May 2020 04:53) (76 - 113)  BP: 106/53 (16 May 2020 04:53) (101/53 - 116/72)  BP(mean): --  RR: 18 (16 May 2020 04:53) (17 - 18)  SpO2: 96% (15 May 2020 14:26) (96% - 98%)  meds: PNV  All: denies  PMH: denies  PSH: d&c  gyn hx: denies  ob hx:    2012 40 wks 8#9 uncomplicated   2016 39 wks 8#2 uncomplicated    d/w Dr Orozco for IOL for SROM @ 40.2 wks  for PO Cytotec  epidural for pain as needed

## 2020-05-16 NOTE — CHART NOTE - NSCHARTNOTEFT_GEN_A_CORE
NP note    Pt seen for cervical examination. Has some mild pressure.    T(C): 36.7 (16 May 2020 15:00), Max: 36.9 (16 May 2020 05:45)  T(F): 98.06 (16 May 2020 15:00), Max: 98.4 (16 May 2020 05:45)  HR: 115 (16 May 2020 17:01) (72 - 115)  BP: 116/57 (16 May 2020 16:48) (91/52 - 122/58)  RR: 16 (16 May 2020 05:45) (16 - 18)  SpO2: 97% (16 May 2020 17:01) (92% - 98%)  /mod gurwinder/- accels/early decels  [place] q2-3min  SVE bulging bag at +2 station    Dr. Santiago notified and en route (ETA 15 min)  D.W Service attendings Dr. Gomez and safety office Dr. Yenny pina, NP

## 2020-05-16 NOTE — OB NEONATOLOGY/PEDIATRICIAN DELIVERY SUMMARY - NSPEDSNEONOTESA_OBGYN_ALL_OB_FT
Baby is a 40.2 week GA male born to a 33 y/o  mother via primary  for cat 2 tracing, failure to progress. Maternal history uncomplicated. Pregnancy uncomplicated. Maternal blood type AB+. Prenatal labs negative, nonreactive and immune. GBS negative on . SROM <18hrs with bloody fluid and terminal mec. Baby born with poor tone, color and respiratory effort. Started immediately on PPV 5/20 at FiO2 100%. Warmed, dried, stimulated, suctioned with thick mec. Improvement in color, tone and respiratory effort after 3-4 mins of resuscitation with increase in O2 sats to mid 80s by 5 MOL and transitioned to CPAP 5/20 at 30% FiO2. CPAP discontinued after 11 MOL and baby maintaining sats in the 90s. Fellow present at delivery deemed baby stable for admission to well baby nursery. Apgars 3/ 8/ 9. EOS 0.18. Mother choosing to bottle feed. She defers circ but consents to Hep B.

## 2020-05-16 NOTE — OB PROVIDER TRIAGE NOTE - NSOBPROVIDERNOTE_OBGYN_ALL_OB_FT
35 yo white female  @ 40.2 wks come sin reporting painful contractions and leaking fluid see pink since 0230. reports +GFM. pt reports seen yeteday in D&T for leaking fluid also.  AP course uncomplicated thus far. denies fever chills dysuria ha n/v/d, epigastric pain, new swelling, vision changes, cough, cp or sob. last seen by OB tuesday, last efw 7#7    GBS: negative 4/15/20  Abd: soft, gravid, NT  LS clear bilaterally  TAS: for position vertex  SVE: /-3  SSE: no pooling, scant pink on spec, cx appears closed, nitrazine equivacle, fern positive  FHT: baseline 115, + accels, negative decels moderate variability  toco: every 8 min  Vital Signs Last 24 Hrs  T(C): 36.7 (16 May 2020 04:53), Max: 36.8 (15 May 2020 13:52)  T(F): 98.1 (16 May 2020 04:53), Max: 98.24 (15 May 2020 13:52)  HR: 76 (16 May 2020 04:53) (76 - 113)  BP: 106/53 (16 May 2020 04:53) (101/53 - 116/72)  BP(mean): --  RR: 18 (16 May 2020 04:53) (17 - 18)  SpO2: 96% (15 May 2020 14:26) (96% - 98%)  meds: PNV  All: denies  PMH: denies  PSH: d&c  gyn hx: denies  ob hx:    2012 40 wks 8#9 uncomplicated   2016 39 wks 8#2 uncomplicated    d/w Dr Orozco for SROM @ 40.2 wks  for PO Cytotec  epidural for pain as neeeded

## 2020-05-16 NOTE — DISCHARGE NOTE OB - PATIENT PORTAL LINK FT
You can access the FollowMyHealth Patient Portal offered by [facility] by registering at the following website: [email or web address]. By joining [facility]’s FollowMyHealth portal, you will also be able to view your health information using other applications (apps) compatible with our system.

## 2020-05-16 NOTE — DISCHARGE NOTE OB - MEDICATION SUMMARY - MEDICATIONS TO TAKE
I will START or STAY ON the medications listed below when I get home from the hospital:    Iron  -- Indication: For Weeks of gestation of pregnancy not specified    Prenatal 1 oral capsule  -- Indication: For Weeks of gestation of pregnancy not specified I will START or STAY ON the medications listed below when I get home from the hospital:    Iron  -- Indication: For Weeks of gestation of pregnancy not specified    acetaminophen 325 mg oral tablet  -- 3 tab(s) by mouth every 6 hours, As Needed  -- Indication: For pain    ibuprofen 600 mg oral tablet  -- 1 tab(s) by mouth every 6 hours, As Needed  -- Indication: For pain    Prenatal 1 oral capsule  -- Indication: For Weeks of gestation of pregnancy not specified

## 2020-05-16 NOTE — CHART NOTE - NSCHARTNOTEFT_GEN_A_CORE
NP note    Pt seen for placement of cervical balloon sp epidural placement. Pt comfortable.     T(C): 36.6 (16 May 2020 09:30), Max: 36.9 (16 May 2020 05:45)  T(F): 97.88 (16 May 2020 09:30), Max: 98.4 (16 May 2020 05:45)  HR: 83 (16 May 2020 12:26) (72 - 113)  BP: 118/61 (16 May 2020 12:21) (91/52 - 118/61)  RR: 16 (16 May 2020 05:45) (16 - 18)  SpO2: 97% (16 May 2020 12:21) (96% - 98%)  /moderate variability/+ accels/no decels  [place] q3-5min  SVE 2-3/70/-3    Cervical balloon placed without incident. 80/80ccs instilled in uterine/vaginal balloons. Pt tolerated well.     -Maintain cervical balloon  -Consider switch to Pitocin 2 hours from last PO Cytotec    ITA pina

## 2020-05-16 NOTE — OB PROVIDER H&P - NSHPPHYSICALEXAM_GEN_ALL_CORE
35 yo white female  @ 40.2 wks come sin reporting painful contractions and leaking fluid see pink since 0230. reports +GFM. pt reports seen yeteday in D&T for leaking fluid also.  AP course uncomplicated thus far. denies fever chills dysuria ha n/v/d, epigastric pain, new swelling, vision changes, cough, cp or sob. last seen by OB tuesday, last efw 7#7    GBS: negative 4/15/20  Abd: soft, gravid, NT  LS clear bilaterally  TAS: for position vertex  SVE: /-3  SSE: no pooling, scant pink on spec, cx appears closed, nitrazine equivocal, fern positive  FHT: baseline 115, + accels, negative decels moderate variability  toco: every 8 min  Vital Signs Last 24 Hrs  T(C): 36.7 (16 May 2020 04:53), Max: 36.8 (15 May 2020 13:52)  T(F): 98.1 (16 May 2020 04:53), Max: 98.24 (15 May 2020 13:52)  HR: 76 (16 May 2020 04:53) (76 - 113)  BP: 106/53 (16 May 2020 04:53) (101/53 - 116/72)  BP(mean): --  RR: 18 (16 May 2020 04:53) (17 - 18)  SpO2: 96% (15 May 2020 14:26) (96% - 98%)  meds: PNV  All: denies  PMH: denies  PSH: d&c  gyn hx: denies  ob hx:    2012 40 wks 8#9 uncomplicated   2016 39 wks 8#2 uncomplicated

## 2020-05-16 NOTE — OB PROVIDER TRIAGE NOTE - PMH
Language barrier  Native language Welsh; comprehends and verbalizes some English  Miscarriage  2019  Normal vaginal delivery    8#9    8#2

## 2020-05-16 NOTE — OB PROVIDER H&P - HISTORY OF PRESENT ILLNESS
35 yo white female  @ 40.2 wks come sin reporting painful contractions and leaking fluid see pink since 023. reports +GFM. pt reports seen yeteday in D&T for leaking fluid also.  AP course uncomplicated thus far. denies fever chills dysuria ha n/v/d, epigastric pain, new swelling, vision changes, cough, cp or sob. last seen by OB tuesday, last efw 7#7    GBS: negative  meds: PNV  All: denies  PMH: denies  PSH: d&c  gyn hx: denies  ob hx:    2012 40 wks 8#9 uncomplicated   2016 39 wks 8#2 uncomplicated

## 2020-05-16 NOTE — CHART NOTE - NSCHARTNOTEFT_GEN_A_CORE
NP note    Pt requesting epidural  Cat I tracing ctx q4-5min  Approved by Dr. Santiago (Dr. Milan hernandez)  For cervical balloon placement after epidural    sredze, NP

## 2020-05-16 NOTE — OB RN DELIVERY SUMMARY - NS_SEPSISRSKCALC_OBGYN_ALL_OB_FT
No temperature has been documented for this patient in CPN or on the OB Flowsheet. Ensure the highest temperature during labor was documented on the OB Flowsheet.  Rupture of membranes must be entered above. EOS calculated successfully. EOS Risk Factor: 0.5/1000 live births ([facility] national incidence); GA=40w2d; Temp=98.4; ROM=16.8; GBS='Negative'; Antibiotics='No antibiotics or any antibiotics < 2 hrs prior to birth'

## 2020-05-16 NOTE — OB PROVIDER DELIVERY SUMMARY - NSPROVIDERDELIVERYNOTE_OBGYN_ALL_OB_FT
Viable Boy to NBN  pt stable to PACU   arrest of descent  LOP    8 lb 1 oz Boy    thick Mec Grossly normal uterus, tubes, ovaries. Viable male infant delivered from vertex presentation. Heavy meconium noted on amniotomy. APGARS 3,8. Weight 8lbs 1oz. Right lower uterine segment extension noted on hysterotomy, repaired in running locked fashion. Rest of the hysterotomy repared in routine running locked fashion in a single layer. Fibrillar placed over hysterotomy repair. Rectus muscles reapproximated in mattress stiches inferiorly. Subcutaneous tissue reapproximated in interrupted fashion. Skin closed in subcuticular fashion. , IVF 2500, UOP 75.

## 2020-05-16 NOTE — DISCHARGE NOTE OB - CARE PLAN
Principal Discharge DX:	 delivery delivered  Goal:	discharge 20  Assessment and plan of treatment:	C/S

## 2020-05-16 NOTE — OB RN PATIENT PROFILE - PMH
Language barrier  Native language Sinhala; comprehends and verbalizes some English  Miscarriage  2019  Normal vaginal delivery    8#9    8#2

## 2020-05-16 NOTE — CHART NOTE - NSCHARTNOTEFT_GEN_A_CORE
NP note    Pt seen by increased pain 10/10 s/p first dose of PO cytotec    T(C): 36.9 (16 May 2020 05:45), Max: 36.9 (16 May 2020 05:45)  T(F): 98.4 (16 May 2020 05:45), Max: 98.4 (16 May 2020 05:45)  HR: 76 (16 May 2020 05:45) (76 - 113)  BP: 106/53 (16 May 2020 05:45) (101/53 - 116/72)  RR: 16 (16 May 2020 05:45) (16 - 18)  SpO2: 96% (15 May 2020 14:26) (96% - 98%)  /moderate variability/+ accels/no decels  [place] irregular  SVE 1/50/-3    D/W pt epidural vs IV pain medication. Pt refused any management at this time.     -Continue PO cytotec for PROM IOL    sredze, NP

## 2020-05-16 NOTE — CHART NOTE - NSCHARTNOTEFT_GEN_A_CORE
NP note    Pt seen for increased pain     T(C): 36.7 (16 May 2020 15:00), Max: 36.9 (16 May 2020 05:45)  T(F): 98.06 (16 May 2020 15:00), Max: 98.4 (16 May 2020 05:45)  HR: 93 (16 May 2020 15:26) (72 - 103)  BP: 114/57 (16 May 2020 15:20) (91/52 - 122/58)  RR: 16 (16 May 2020 05:45) (16 - 18)  SpO2: 96% (16 May 2020 15:26) (92% - 98%)  /moderate variability/+ accels/no decels  [place] q2-4min  SVE 5/70/-3 cervical balloon in vaginal vault removed; forebag present; head high    -Approved for top off  -Re-examine 2 hours from last PO cytotec dose  -D/W Dr. Jack pina, NP

## 2020-05-16 NOTE — OB PROVIDER TRIAGE NOTE - NSHPPHYSICALEXAM_GEN_ALL_CORE
35 yo white female  @ 40.2 wks come sin reporting painful contractions and leaking fluid see pink since 0230. reports +GFM. pt reports seen yeteday in D&T for leaking fluid also.  AP course uncomplicated thus far. denies fever chills dysuria ha n/v/d, epigastric pain, new swelling, vision changes, cough, cp or sob. last seen by OB tuesday, last efw 7#7    GBS: negative 4/15/20  Abd: soft, gravid, NT  LS clear bilaterally  TAS: for position vertex  SVE: /-3  SSE: no pooling, scant pink on spec, cx appears closed, nitrazine equivacle, fern positive  FHT: baseline 115, + accels, negative decels moderate variability  toco: every 8 min  Vital Signs Last 24 Hrs  T(C): 36.7 (16 May 2020 04:53), Max: 36.8 (15 May 2020 13:52)  T(F): 98.1 (16 May 2020 04:53), Max: 98.24 (15 May 2020 13:52)  HR: 76 (16 May 2020 04:53) (76 - 113)  BP: 106/53 (16 May 2020 04:53) (101/53 - 116/72)  BP(mean): --  RR: 18 (16 May 2020 04:53) (17 - 18)  SpO2: 96% (15 May 2020 14:26) (96% - 98%)  meds: PNV  All: denies  PMH: denies  PSH: d&c  gyn hx: denies  ob hx:    2012 40 wks 8#9 uncomplicated   2016 39 wks 8#2 uncomplicated

## 2020-05-16 NOTE — DISCHARGE NOTE OB - CARE PROVIDER_API CALL
Mychal Rodriguez  OBSTETRICS AND GYNECOLOGY  [id number] [place] [address]  Phone: [phone]  Fax: [phone]  Follow Up Time:

## 2020-05-17 LAB
BASOPHILS # BLD AUTO: 0.04 K/UL — SIGNIFICANT CHANGE UP (ref 0–0.2)
BASOPHILS NFR BLD AUTO: 0.2 % — SIGNIFICANT CHANGE UP (ref 0–2)
BASOPHILS NFR SPEC: 0 % — SIGNIFICANT CHANGE UP (ref 0–2)
BLASTS # FLD: 0 % — SIGNIFICANT CHANGE UP (ref 0–0)
EOSINOPHIL # BLD AUTO: 0.01 K/UL — SIGNIFICANT CHANGE UP (ref 0–0.5)
EOSINOPHIL NFR BLD AUTO: 0 % — SIGNIFICANT CHANGE UP (ref 0–6)
EOSINOPHIL NFR FLD: 0 % — SIGNIFICANT CHANGE UP (ref 0–6)
HCT VFR BLD CALC: 30.5 % — LOW (ref 34.5–45)
HGB BLD-MCNC: 10.2 G/DL — LOW (ref 11.5–15.5)
IMM GRANULOCYTES NFR BLD AUTO: 1 % — SIGNIFICANT CHANGE UP (ref 0–1.5)
LYMPHOCYTES # BLD AUTO: 1.15 K/UL — SIGNIFICANT CHANGE UP (ref 1–3.3)
LYMPHOCYTES # BLD AUTO: 5.4 % — LOW (ref 13–44)
LYMPHOCYTES NFR SPEC AUTO: 2.6 % — LOW (ref 13–44)
MCHC RBC-ENTMCNC: 31.3 PG — SIGNIFICANT CHANGE UP (ref 27–34)
MCHC RBC-ENTMCNC: 33.4 % — SIGNIFICANT CHANGE UP (ref 32–36)
MCV RBC AUTO: 93.6 FL — SIGNIFICANT CHANGE UP (ref 80–100)
METAMYELOCYTES # FLD: 0 % — SIGNIFICANT CHANGE UP (ref 0–1)
MONOCYTES # BLD AUTO: 0.9 K/UL — SIGNIFICANT CHANGE UP (ref 0–0.9)
MONOCYTES NFR BLD AUTO: 4.3 % — SIGNIFICANT CHANGE UP (ref 2–14)
MONOCYTES NFR BLD: 4.4 % — SIGNIFICANT CHANGE UP (ref 2–9)
MYELOCYTES NFR BLD: 0 % — SIGNIFICANT CHANGE UP (ref 0–0)
NEUTROPHIL AB SER-ACNC: 72.8 % — SIGNIFICANT CHANGE UP (ref 43–77)
NEUTROPHILS # BLD AUTO: 18.86 K/UL — HIGH (ref 1.8–7.4)
NEUTROPHILS NFR BLD AUTO: 89.1 % — HIGH (ref 43–77)
NEUTS BAND # BLD: 19.3 % — HIGH (ref 0–6)
NRBC # FLD: 0 K/UL — SIGNIFICANT CHANGE UP (ref 0–0)
OTHER - HEMATOLOGY %: 0 — SIGNIFICANT CHANGE UP
PLATELET # BLD AUTO: 211 K/UL — SIGNIFICANT CHANGE UP (ref 150–400)
PLATELET COUNT - ESTIMATE: NORMAL — SIGNIFICANT CHANGE UP
PMV BLD: 10.1 FL — SIGNIFICANT CHANGE UP (ref 7–13)
PROMYELOCYTES # FLD: 0 % — SIGNIFICANT CHANGE UP (ref 0–0)
RBC # BLD: 3.26 M/UL — LOW (ref 3.8–5.2)
RBC # FLD: 13.6 % — SIGNIFICANT CHANGE UP (ref 10.3–14.5)
VARIANT LYMPHS # BLD: 0.9 % — SIGNIFICANT CHANGE UP
WBC # BLD: 21.17 K/UL — HIGH (ref 3.8–10.5)
WBC # FLD AUTO: 21.17 K/UL — HIGH (ref 3.8–10.5)

## 2020-05-17 RX ORDER — IBUPROFEN 200 MG
1 TABLET ORAL
Qty: 0 | Refills: 0 | DISCHARGE
Start: 2020-05-17

## 2020-05-17 RX ORDER — FERROUS SULFATE 325(65) MG
325 TABLET ORAL DAILY
Refills: 0 | Status: DISCONTINUED | OUTPATIENT
Start: 2020-05-17 | End: 2020-05-18

## 2020-05-17 RX ORDER — IBUPROFEN 200 MG
600 TABLET ORAL EVERY 6 HOURS
Refills: 0 | Status: DISCONTINUED | OUTPATIENT
Start: 2020-05-17 | End: 2020-05-18

## 2020-05-17 RX ORDER — SENNA PLUS 8.6 MG/1
1 TABLET ORAL
Refills: 0 | Status: DISCONTINUED | OUTPATIENT
Start: 2020-05-17 | End: 2020-05-18

## 2020-05-17 RX ORDER — OXYCODONE HYDROCHLORIDE 5 MG/1
5 TABLET ORAL
Refills: 0 | Status: DISCONTINUED | OUTPATIENT
Start: 2020-05-17 | End: 2020-05-18

## 2020-05-17 RX ORDER — ACETAMINOPHEN 500 MG
3 TABLET ORAL
Qty: 0 | Refills: 0 | DISCHARGE
Start: 2020-05-17

## 2020-05-17 RX ADMIN — Medication 975 MILLIGRAM(S): at 03:34

## 2020-05-17 RX ADMIN — Medication 975 MILLIGRAM(S): at 21:34

## 2020-05-17 RX ADMIN — HEPARIN SODIUM 5000 UNIT(S): 5000 INJECTION INTRAVENOUS; SUBCUTANEOUS at 13:06

## 2020-05-17 RX ADMIN — Medication 30 MILLIGRAM(S): at 02:20

## 2020-05-17 RX ADMIN — Medication 30 MILLIGRAM(S): at 09:46

## 2020-05-17 RX ADMIN — Medication 975 MILLIGRAM(S): at 09:46

## 2020-05-17 RX ADMIN — Medication 30 MILLIGRAM(S): at 17:55

## 2020-05-17 RX ADMIN — OXYCODONE HYDROCHLORIDE 5 MILLIGRAM(S): 5 TABLET ORAL at 21:34

## 2020-05-17 RX ADMIN — OXYCODONE HYDROCHLORIDE 5 MILLIGRAM(S): 5 TABLET ORAL at 05:08

## 2020-05-17 RX ADMIN — Medication 1000 MILLIUNIT(S)/MIN: at 01:59

## 2020-05-17 NOTE — PROGRESS NOTE ADULT - SUBJECTIVE AND OBJECTIVE BOX
Pain Service Follow-up  Postop Day  1    S/P  C- Section    T(C): 36.4 (05-17-20 @ 06:00), Max: 37.7 (05-16-20 @ 20:20)  HR: 80 (05-17-20 @ 06:00) (72 - 126)  BP: 106/60 (05-17-20 @ 06:00) (91/52 - 137/64)  RR: 16 (05-17-20 @ 06:00) (16 - 22)  SpO2: 97% (05-17-20 @ 06:00) (70% - 100%)  Wt(kg): --      THERAPY:     S/P Epidural Morphine    Sedation Score:	  [X] Alert	      [  ] Drowsy       [  ] Arousable	[  ] Asleep         [  ] Unresponsive    Side Effects:	  [X] None	      [  ] Nausea       [  ] Pruritus        [  ] Weakness   [  ] Numbness        ASSESSMENT/ PLAN   [ X ] Discontinue         [  ] Continue    [ X ] Documentation and Verification of current medications       Satisfactory Post Anesthetic Course

## 2020-05-17 NOTE — PROGRESS NOTE ADULT - SUBJECTIVE AND OBJECTIVE BOX
Postpartum Note,  Section  She is a  34y woman who is now post-operative day: 1    Subjective:  The patient feels well.  She is ambulating.   She is tolerating regular diet.  She denies nausea and vomiting.  She is voiding.  Her pain is controlled.  She reports normal postpartum bleeding.  She is breastfeeding.  She is formula feeding.    Physical exam:    Vital Signs Last 24 Hrs  T(C): 36.4 (17 May 2020 06:00), Max: 37.7 (16 May 2020 20:20)  T(F): 97.6 (17 May 2020 06:00), Max: 99.9 (16 May 2020 20:20)  HR: 80 (17 May 2020 06:00) (76 - 126)  BP: 106/60 (17 May 2020 06:00) (98/61 - 137/64)  BP(mean): 76 (16 May 2020 22:15) (62 - 83)  RR: 16 (17 May 2020 06:00) (16 - 22)  SpO2: 97% (17 May 2020 06:00) (70% - 100%)    Gen: NAD  Breast: Soft, nontender, not engorged.  Abdomen: Soft, nontender, no distension , firm uterine fundus at umbilicus.  Incision: Clean, dry, and intact with steri strips  Pelvic: Normal lochia noted  Ext: No calf tenderness    LABS:                        10.2   21.17 )-----------( 211      ( 17 May 2020 06:05 )             30.5       Rubella status:     Allergies    No Known Allergies    Intolerances      MEDICATIONS  (STANDING):  acetaminophen   Tablet .. 975 milliGRAM(s) Oral <User Schedule>  diphtheria/tetanus/pertussis (acellular) Vaccine (ADAcel) 0.5 milliLiter(s) IntraMuscular once  ferrous    sulfate 325 milliGRAM(s) Oral daily  heparin   Injectable 5000 Unit(s) SubCutaneous every 12 hours  ibuprofen  Tablet. 600 milliGRAM(s) Oral every 6 hours  influenza   Vaccine 0.5 milliLiter(s) IntraMuscular once  ketorolac   Injectable 30 milliGRAM(s) IV Push every 6 hours  prenatal multivitamin 1 Tablet(s) Oral daily    MEDICATIONS  (PRN):  diphenhydrAMINE 25 milliGRAM(s) Oral every 6 hours PRN Itching  lanolin Ointment 1 Application(s) Topical every 6 hours PRN Sore Nipples  magnesium hydroxide Suspension 30 milliLiter(s) Oral two times a day PRN Constipation  oxyCODONE    IR 5 milliGRAM(s) Oral once PRN Moderate to Severe Pain (4-10)  oxyCODONE    IR 5 milliGRAM(s) Oral every 3 hours PRN Moderate to Severe Pain (4-10)  senna 1 Tablet(s) Oral two times a day PRN Constipation  simethicone 80 milliGRAM(s) Chew every 4 hours PRN Gas        Assessment and Plan  POD # s/p  section  Doing well.  Encourage ambulation.

## 2020-05-18 VITALS
OXYGEN SATURATION: 98 % | SYSTOLIC BLOOD PRESSURE: 109 MMHG | DIASTOLIC BLOOD PRESSURE: 62 MMHG | HEART RATE: 93 BPM | RESPIRATION RATE: 16 BRPM | TEMPERATURE: 98 F

## 2020-05-18 LAB
BASOPHILS # BLD AUTO: 0.03 K/UL — SIGNIFICANT CHANGE UP (ref 0–0.2)
BASOPHILS NFR BLD AUTO: 0.1 % — SIGNIFICANT CHANGE UP (ref 0–2)
EOSINOPHIL # BLD AUTO: 0.17 K/UL — SIGNIFICANT CHANGE UP (ref 0–0.5)
EOSINOPHIL NFR BLD AUTO: 0.8 % — SIGNIFICANT CHANGE UP (ref 0–6)
HCT VFR BLD CALC: 26.2 % — LOW (ref 34.5–45)
HGB BLD-MCNC: 8.5 G/DL — LOW (ref 11.5–15.5)
IMM GRANULOCYTES NFR BLD AUTO: 2.5 % — HIGH (ref 0–1.5)
LYMPHOCYTES # BLD AUTO: 1.66 K/UL — SIGNIFICANT CHANGE UP (ref 1–3.3)
LYMPHOCYTES # BLD AUTO: 8.3 % — LOW (ref 13–44)
MCHC RBC-ENTMCNC: 30.6 PG — SIGNIFICANT CHANGE UP (ref 27–34)
MCHC RBC-ENTMCNC: 32.4 % — SIGNIFICANT CHANGE UP (ref 32–36)
MCV RBC AUTO: 94.2 FL — SIGNIFICANT CHANGE UP (ref 80–100)
MONOCYTES # BLD AUTO: 0.97 K/UL — HIGH (ref 0–0.9)
MONOCYTES NFR BLD AUTO: 4.8 % — SIGNIFICANT CHANGE UP (ref 2–14)
NEUTROPHILS # BLD AUTO: 16.71 K/UL — HIGH (ref 1.8–7.4)
NEUTROPHILS NFR BLD AUTO: 83.5 % — HIGH (ref 43–77)
NRBC # FLD: 0 K/UL — SIGNIFICANT CHANGE UP (ref 0–0)
PLATELET # BLD AUTO: 194 K/UL — SIGNIFICANT CHANGE UP (ref 150–400)
PMV BLD: 9.8 FL — SIGNIFICANT CHANGE UP (ref 7–13)
RBC # BLD: 2.78 M/UL — LOW (ref 3.8–5.2)
RBC # FLD: 13.5 % — SIGNIFICANT CHANGE UP (ref 10.3–14.5)
T PALLIDUM AB TITR SER: NEGATIVE — SIGNIFICANT CHANGE UP
WBC # BLD: 20.04 K/UL — HIGH (ref 3.8–10.5)
WBC # FLD AUTO: 20.04 K/UL — HIGH (ref 3.8–10.5)

## 2020-05-18 RX ADMIN — Medication 975 MILLIGRAM(S): at 10:30

## 2020-05-18 RX ADMIN — Medication 325 MILLIGRAM(S): at 11:29

## 2020-05-18 RX ADMIN — Medication 30 MILLIGRAM(S): at 00:41

## 2020-05-18 RX ADMIN — Medication 600 MILLIGRAM(S): at 14:27

## 2020-05-18 RX ADMIN — HEPARIN SODIUM 5000 UNIT(S): 5000 INJECTION INTRAVENOUS; SUBCUTANEOUS at 00:41

## 2020-05-18 RX ADMIN — Medication 600 MILLIGRAM(S): at 06:00

## 2020-05-18 NOTE — PROGRESS NOTE ADULT - SUBJECTIVE AND OBJECTIVE BOX
Post Op C/S      Pt without complaints  vital signs stable      Abdomen soft  fundus firm  Incision clean dry and intact  extremities non tender    lochia wnl      Patient doing well  Routine post operative care    Patient requests discharge today-Discharge home

## 2021-12-07 NOTE — OB PROVIDER TRIAGE NOTE - GRAVIDA, OB PROFILE
4 Clofazimine Counseling:  I discussed with the patient the risks of clofazimine including but not limited to skin and eye pigmentation, liver damage, nausea/vomiting, gastrointestinal bleeding and allergy.

## 2023-07-20 NOTE — DISCHARGE NOTE OB - THE PATIENT HAS
Sukhdev Leung  Urogynecology  [address]  [place] [address]  Phone: [phone]  Fax: [phone]  Follow Up Time:    no difficulties

## 2023-10-09 NOTE — OB PROVIDER TRIAGE NOTE - NSLASTOTHERPREGNANCY_OBGYN_ALL_OB_DT
24-Sep-2012 Niacinamide Pregnancy And Lactation Text: These medications are considered safe during pregnancy.